# Patient Record
Sex: MALE | Race: OTHER
[De-identification: names, ages, dates, MRNs, and addresses within clinical notes are randomized per-mention and may not be internally consistent; named-entity substitution may affect disease eponyms.]

---

## 2019-04-11 ENCOUNTER — HOSPITAL ENCOUNTER (EMERGENCY)
Dept: HOSPITAL 62 - ER | Age: 34
LOS: 1 days | Discharge: HOME | End: 2019-04-12
Payer: SELF-PAY

## 2019-04-11 DIAGNOSIS — F10.10: Primary | ICD-10-CM

## 2019-04-11 PROCEDURE — 99284 EMERGENCY DEPT VISIT MOD MDM: CPT

## 2019-04-11 PROCEDURE — S0119 ONDANSETRON 4 MG: HCPCS

## 2019-04-11 PROCEDURE — 81001 URINALYSIS AUTO W/SCOPE: CPT

## 2019-04-12 VITALS — DIASTOLIC BLOOD PRESSURE: 79 MMHG | SYSTOLIC BLOOD PRESSURE: 133 MMHG

## 2019-04-12 LAB
APPEARANCE UR: CLEAR
APTT PPP: YELLOW S
BILIRUB UR QL STRIP: NEGATIVE
GLUCOSE UR STRIP-MCNC: NEGATIVE MG/DL
KETONES UR STRIP-MCNC: NEGATIVE MG/DL
NITRITE UR QL STRIP: NEGATIVE
PH UR STRIP: 5 [PH] (ref 5–9)
PROT UR STRIP-MCNC: NEGATIVE MG/DL
SP GR UR STRIP: 1.02
UROBILINOGEN UR-MCNC: 4 MG/DL (ref ?–2)

## 2019-04-12 NOTE — ER DOCUMENT REPORT
ED General





- General


Chief Complaint: ETOH Abuse


Stated Complaint: ETOH


Time Seen by Provider: 04/12/19 00:49


Notes: 





Patient is a 33-year-old male who denies chronic medical problems, states that 

he has been a chronic alcoholic for the past 14 years although has had up to 2 

years of sobriety during that time who presents with 5 days of heavy alcohol 

consumption.  Patient states that he is been drinking most all day every day for

the past 5 days with minimal food intake.  States that he came to the emergency 

department tonight because he was having difficulty sleeping and felt like he 

needed to get help coming off of alcohol.  Denies history of complicated 

withdrawals in the past.  Does not have a primary care physician.  Has not 

sought resources through detox.  He denies any abdominal pain, nausea, vomiting,

headache, neck pain, chest pain or any other medical concerns.  States that he 

is here simply because he is trying not to drink so much.  He is uncertain 

whether or not he wants to discontinue drinking entirely.  His last drink was a 

proximally 1 hour prior to arrival.  History is otherwise somewhat limited 

secondary to patient's intoxication





The history and physical exam was obtained by the provider using [employer]. A [facility]  was offered to the patient and any family at the 

bedside at the beginning of the encounter and was declined.


TRAVEL OUTSIDE OF THE [place] IN LAST 30 DAYS: No





- Related Data


Allergies/Adverse Reactions: 


                                        





No Known Drug Allergies Allergy (Verified 04/11/19 23:23)


   











Past Medical History





- General


Information source: Patient





- Social History


Smoking Status: Never Smoker


Frequency of alcohol use: Heavy


Drug Abuse: None


Family History: Reviewed & Not Pertinent


Patient has suicidal ideation: No


Patient has homicidal ideation: No


Renal/ Medical History: Denies: Hx Peritoneal Dialysis





Review of Systems





- Review of Systems


Notes: 





Constitutional: Negative for fever.


HENT: Negative for sore throat.


Eyes: Negative for visual changes.


Cardiovascular: Negative for chest pain.


Respiratory: Negative for shortness of breath.


Gastrointestinal: Negative for abdominal pain, vomiting or diarrhea.


Genitourinary: Negative for dysuria.


Musculoskeletal: Negative for back pain.


Skin: Negative for rash.


Neurological: Negative for headaches, weakness or numbness.





10 point ROS negative except as marked above and in HPI.





Physical Exam





- Vital signs


Vitals: 


                                        











Pulse Resp BP Pulse Ox


 


 80   16   140/92 H  99 


 


 04/11/19 23:28  04/11/19 23:28  04/11/19 23:28  04/11/19 23:28











Interpretation: Normal


Notes: 





PHYSICAL EXAMINATION:





GENERAL: Well-appearing, well-nourished and in no acute distress.





HEAD: Atraumatic, normocephalic.





EYES: Pupils equal round and reactive to light, extraocular movements intact, 

sclera anicteric, conjunctiva are normal.





ENT: nares patent, oropharynx clear without exudates.  Moist mucous membranes.





NECK: Normal range of motion, supple without lymphadenopathy





LUNGS: Breath sounds clear to auscultation bilaterally and equal.  No wheezes 

rales or rhonchi.





HEART: Regular rate and rhythm without murmurs





ABDOMEN: Soft, nontender, normoactive bowel sounds.  No guarding, no rebound.  

No masses appreciated.





EXTREMITIES: Normal range of motion, no pitting or edema.  No cyanosis.





NEUROLOGICAL: No focal neurological deficits. Moves all extremities 

spontaneously and on command.





PSYCH: Mildly intoxicated, pleasant on contact





SKIN: Warm, Dry, normal turgor, no rashes or lesions noted.





Course





- Re-evaluation


Re-evalutation: 





04/12/19 03:00


Patient presents with acute alcohol intoxication without any additional acute 

complaints.  Patient is asking for assistance with coming off of alcohol.  Has 

been provided resources.  No signs of significant withdrawal to indicate need 

for hospitalization.  Views labs.  Admits to heavy alcohol use today.  No 

evidence of trauma on exam.  Able to ambulate and talking clear sentences prior 

to discharge.  Tolerating oral intake without difficulty.  At this time will 

discharge with return precautions and follow-up recommendations.  Verbal 

discharge instructions given a the bedside and opportunity for questions given. 

Medication warnings reviewed. Patient is in agreement with this plan and has 

verbalized understanding of return precautions and the need for primary care 

follow-up in the next 24-72 hours.





- Vital Signs


Vital signs: 


                                        











Temp Pulse Resp BP Pulse Ox


 


 98.7 F   65   20   133/79 H  100 


 


 04/12/19 03:34  04/12/19 03:34  04/12/19 03:34  04/12/19 03:34  04/12/19 03:34














- Laboratory


Laboratory results interpreted by me: 


                                        











  04/12/19





  01:22


 


Urine Blood  SMALL H


 


Urine Urobilinogen  4.0 H














Discharge





- Discharge


Clinical Impression: 


 Alcohol abuse





Condition: Good


Disposition: HOME, SELF-CARE


Additional Instructions: 


 You need to return to the emergency room immediately if you pass out, or 

vomiting so severely your unable to keep anything down, start hallucinate, or 

have any other symptoms that are of concern to you.  You need to go to an 

inpatient program and should speak with your primary care doctor regarding these

resources.

## 2019-04-12 NOTE — ER DOCUMENT REPORT
ED Medical Screen (RME)





- General


Chief Complaint: ETOH Abuse


Stated Complaint: ETOH


Time Seen by Provider: 04/12/19 00:49


Notes: 


33-year-old male with history of alcohol abuse, chief complaint of being on a 5-

day binge where he has been drinking persistently.  He states today he started 

getting abdominal pain and vomited multiple times.  States he only saw tiny 

spots of blood in his vomit.  Reports current abdominal pain and nausea.  Report

s that he is gone long periods of time without drinking frequently.  Denies any 

surgeries or medical problems otherwise.  States that he wants to stop drinking.

 Denies SI or HI.


TRAVEL OUTSIDE OF THE [place] IN LAST 30 DAYS: No





- Related Data


Allergies/Adverse Reactions: 


                                        





No Known Drug Allergies Allergy (Verified 04/11/19 23:23)


   











Past Medical History





- Social History


Frequency of alcohol use: Heavy


Drug Abuse: None


Renal/ Medical History: Denies: Hx Peritoneal Dialysis





Physical Exam





- Vital signs


Vitals: 





                                        











Pulse Resp BP Pulse Ox


 


 80   16   140/92 H  99 


 


 04/11/19 23:28  04/11/19 23:28  04/11/19 23:28  04/11/19 23:28














- General


General appearance: Alert





- Abdominal


Tenderness: Tender - Generalized mid to upper abdominal pain with some wincing





Course





- Vital Signs


Vital signs: 





                                        











Temp Pulse Resp BP Pulse Ox


 


    80   16   140/92 H  99 


 


    04/11/19 23:28  04/11/19 23:28  04/11/19 23:28  04/11/19 23:28